# Patient Record
Sex: FEMALE | Race: WHITE | Employment: FULL TIME | URBAN - METROPOLITAN AREA
[De-identification: names, ages, dates, MRNs, and addresses within clinical notes are randomized per-mention and may not be internally consistent; named-entity substitution may affect disease eponyms.]

---

## 2018-04-03 ENCOUNTER — OFFICE VISIT (OUTPATIENT)
Dept: DERMATOLOGY CLINIC | Facility: CLINIC | Age: 38
End: 2018-04-03

## 2018-04-03 DIAGNOSIS — L30.9 DERMATITIS: Primary | ICD-10-CM

## 2018-04-03 PROCEDURE — 99212 OFFICE O/P EST SF 10 MIN: CPT | Performed by: DERMATOLOGY

## 2018-04-03 PROCEDURE — 99202 OFFICE O/P NEW SF 15 MIN: CPT | Performed by: DERMATOLOGY

## 2018-04-03 RX ORDER — BUPROPION HYDROCHLORIDE 200 MG/1
200 TABLET, EXTENDED RELEASE ORAL 2 TIMES DAILY
COMMUNITY

## 2018-04-03 RX ORDER — BUPROPION HCL 100 MG
TABLET,SUSTAINED-RELEASE 12 HR ORAL
COMMUNITY
Start: 2018-02-16

## 2018-04-03 RX ORDER — PROPRANOLOL HYDROCHLORIDE 20 MG/1
TABLET ORAL
COMMUNITY
Start: 2018-03-15

## 2018-04-03 RX ORDER — VALACYCLOVIR HYDROCHLORIDE 1 G/1
TABLET, FILM COATED ORAL
COMMUNITY
Start: 2018-03-21

## 2018-04-03 RX ORDER — TACROLIMUS 1 MG/G
1 OINTMENT TOPICAL 2 TIMES DAILY
Qty: 30 G | Refills: 1 | Status: SHIPPED | OUTPATIENT
Start: 2018-04-03

## 2018-04-03 RX ORDER — CETIRIZINE HYDROCHLORIDE 5 MG/1
5 TABLET ORAL DAILY
COMMUNITY

## 2018-04-03 NOTE — PROGRESS NOTES
HPI:     Chief Complaint     Derm Problem        HPI     Derm Problem    Additional comments: New pt. Was seen previosuly at outside dermatologist, 12/4/2014. Pt presents with possible allergic reaction to face/eyes, pt states condition comes and goes.  Pt mg by mouth 2 (two) times daily. Disp:  Rfl:    Cetirizine HCl 5 MG Oral Tab Take 5 mg by mouth daily.  Disp:  Rfl:    WELLBUTRIN  MG Oral Tablet 24 Hr  Disp:  Rfl:    WELLBUTRIN  MG Oral Tablet 12 Hr  Disp:  Rfl:    tacrolimus (PROTOPIC) 0.1 % is unlikely to clear without treatment even with the elimination. We will give trial of Protopic ointment to be applied twice daily. Side effects and black box warning discussed.   Patient understands it may burn but hopefully the burning will go away aft